# Patient Record
Sex: FEMALE | Race: WHITE | NOT HISPANIC OR LATINO | URBAN - NONMETROPOLITAN AREA
[De-identification: names, ages, dates, MRNs, and addresses within clinical notes are randomized per-mention and may not be internally consistent; named-entity substitution may affect disease eponyms.]

---

## 2019-05-20 ENCOUNTER — IMPORTED ENCOUNTER (OUTPATIENT)
Dept: URBAN - NONMETROPOLITAN AREA CLINIC 1 | Facility: CLINIC | Age: 59
End: 2019-05-20

## 2019-05-20 PROBLEM — H52.4: Noted: 2019-05-20

## 2019-05-20 PROBLEM — H52.223: Noted: 2019-05-20

## 2019-05-20 PROBLEM — H52.11: Noted: 2019-05-20

## 2019-05-20 PROCEDURE — S0620 ROUTINE OPHTHALMOLOGICAL EXA: HCPCS

## 2021-04-06 ENCOUNTER — IMPORTED ENCOUNTER (OUTPATIENT)
Dept: URBAN - NONMETROPOLITAN AREA CLINIC 1 | Facility: CLINIC | Age: 61
End: 2021-04-06

## 2021-04-06 PROBLEM — H52.4: Noted: 2021-04-06

## 2021-04-06 PROBLEM — H43.391: Noted: 2021-04-06

## 2021-04-06 PROBLEM — H35.433: Noted: 2021-04-06

## 2021-04-06 PROBLEM — H43.813: Noted: 2021-10-07

## 2021-04-06 PROBLEM — H52.223: Noted: 2021-04-06

## 2021-04-06 PROBLEM — H25.13: Noted: 2021-04-06

## 2021-04-06 PROBLEM — H52.11: Noted: 2021-04-06

## 2021-04-06 PROCEDURE — 99214 OFFICE O/P EST MOD 30 MIN: CPT

## 2021-04-06 NOTE — PATIENT DISCUSSION
PVD OD-Retina flat 360 with no breaks tears or heme.-S&S of RD/RT reviewed with pt.-Stressed that pt should contact our office right away with any changes or increase in symptoms. 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 - Recommend observation. Presbyopia OU-Discussed diagnosis with patient. Myopia OD-Discussed diagnosis with patient. -Explained that people who are myopic are at a higher risk for developing RD/RT and reviewed associated S&S.-Pt to contact our office if symptoms develop. Astigmatism OU-Discussed diagnosis with patient. Updated spec Rx given. Recommend lens that will provide comfort as well as protect safety and health of eyes.

## 2021-10-07 ENCOUNTER — IMPORTED ENCOUNTER (OUTPATIENT)
Dept: URBAN - NONMETROPOLITAN AREA CLINIC 1 | Facility: CLINIC | Age: 61
End: 2021-10-07

## 2021-10-07 PROCEDURE — 92015 DETERMINE REFRACTIVE STATE: CPT

## 2021-10-07 PROCEDURE — 92250 FUNDUS PHOTOGRAPHY W/I&R: CPT

## 2021-10-07 PROCEDURE — 99214 OFFICE O/P EST MOD 30 MIN: CPT

## 2021-10-07 NOTE — PATIENT DISCUSSION
PVD OUDiscussed findings of exam in detail with the patient. The risk of retinal detachment in patients with PVDs was discussed with the patient and the warning signs of retinal detachment were carefully reviewed with the patient. The patient was warned to return to the office or contact the ophthalmologist on call immediately if they experience signs of retinal detachment. Photos done today; no holes tears or detachments. Continue to monitor. RTC in 1 year with [address] [place] [address] OUDiscussed diagnosis with patient. Reviewed symptoms related to cataract progression. No treatment required at this time. Continue to monitor. Paving stone degeneration OUDiscussed diagnosis in detail with patient. Mild finding present today. Continue to monitor. Presbyopia OUDiscussed refractive status in detail with patient. New glasses Rx given today. Continue to monitor.

## 2022-04-09 ASSESSMENT — TONOMETRY
OS_IOP_MMHG: 15
OD_IOP_MMHG: 18
OS_IOP_MMHG: 18
OD_IOP_MMHG: 17
OS_IOP_MMHG: 18
OD_IOP_MMHG: 15

## 2022-04-09 ASSESSMENT — VISUAL ACUITY
OD_CC: 20/25+2
OS_CC: 20/30
OD_SC: 20/30-
OD_SC: J3
OS_SC: 20/20
OS_SC: 20/30+
OS_SC: J16
OD_CC: 20/80

## 2022-10-11 ENCOUNTER — ESTABLISHED PATIENT (OUTPATIENT)
Dept: RURAL CLINIC 3 | Facility: CLINIC | Age: 62
End: 2022-10-11

## 2022-10-11 DIAGNOSIS — H25.13: ICD-10-CM

## 2022-10-11 DIAGNOSIS — H43.813: ICD-10-CM

## 2022-10-11 DIAGNOSIS — H52.4: ICD-10-CM

## 2022-10-11 PROCEDURE — 99214 OFFICE O/P EST MOD 30 MIN: CPT

## 2022-10-11 PROCEDURE — 92250 FUNDUS PHOTOGRAPHY W/I&R: CPT

## 2022-10-11 PROCEDURE — 92015 DETERMINE REFRACTIVE STATE: CPT

## 2022-10-11 ASSESSMENT — TONOMETRY
OD_IOP_MMHG: 13
OS_IOP_MMHG: 13

## 2022-10-11 ASSESSMENT — VISUAL ACUITY
OS_CC: 20/30
OD_CC: 20/25

## 2023-10-17 ENCOUNTER — FOLLOW UP (OUTPATIENT)
Dept: RURAL CLINIC 3 | Facility: CLINIC | Age: 63
End: 2023-10-17

## 2023-10-17 DIAGNOSIS — H43.813: ICD-10-CM

## 2023-10-17 DIAGNOSIS — H25.13: ICD-10-CM

## 2023-10-17 DIAGNOSIS — H35.433: ICD-10-CM

## 2023-10-17 DIAGNOSIS — H52.11: ICD-10-CM

## 2023-10-17 PROCEDURE — 92250 FUNDUS PHOTOGRAPHY W/I&R: CPT

## 2023-10-17 PROCEDURE — 99214 OFFICE O/P EST MOD 30 MIN: CPT

## 2023-10-17 PROCEDURE — 92015 DETERMINE REFRACTIVE STATE: CPT

## 2023-10-17 ASSESSMENT — TONOMETRY
OD_IOP_MMHG: 17
OS_IOP_MMHG: 17

## 2023-10-17 ASSESSMENT — VISUAL ACUITY
OD_CC: 20/20-2
OU_CC: 20/20-1
OS_CC: 20/20-2